# Patient Record
Sex: MALE | Race: WHITE | HISPANIC OR LATINO | Employment: FULL TIME | URBAN - METROPOLITAN AREA
[De-identification: names, ages, dates, MRNs, and addresses within clinical notes are randomized per-mention and may not be internally consistent; named-entity substitution may affect disease eponyms.]

---

## 2024-05-24 ENCOUNTER — OFFICE VISIT (OUTPATIENT)
Dept: URGENT CARE | Facility: PHYSICIAN GROUP | Age: 19
End: 2024-05-24
Payer: COMMERCIAL

## 2024-05-24 VITALS
WEIGHT: 131 LBS | HEART RATE: 98 BPM | HEIGHT: 67 IN | OXYGEN SATURATION: 98 % | SYSTOLIC BLOOD PRESSURE: 100 MMHG | TEMPERATURE: 99 F | DIASTOLIC BLOOD PRESSURE: 68 MMHG | BODY MASS INDEX: 20.56 KG/M2 | RESPIRATION RATE: 16 BRPM

## 2024-05-24 DIAGNOSIS — H69.92 DYSFUNCTION OF LEFT EUSTACHIAN TUBE: ICD-10-CM

## 2024-05-24 DIAGNOSIS — H61.23 BILATERAL IMPACTED CERUMEN: ICD-10-CM

## 2024-05-24 DIAGNOSIS — H91.93 DECREASED HEARING OF BOTH EARS: ICD-10-CM

## 2024-05-24 PROCEDURE — 3078F DIAST BP <80 MM HG: CPT | Performed by: NURSE PRACTITIONER

## 2024-05-24 PROCEDURE — 69210 REMOVE IMPACTED EAR WAX UNI: CPT | Mod: 50 | Performed by: NURSE PRACTITIONER

## 2024-05-24 PROCEDURE — 99203 OFFICE O/P NEW LOW 30 MIN: CPT | Mod: 25 | Performed by: NURSE PRACTITIONER

## 2024-05-24 PROCEDURE — 3074F SYST BP LT 130 MM HG: CPT | Performed by: NURSE PRACTITIONER

## 2024-05-24 NOTE — PROGRESS NOTES
"Phuc Galeana is a 18 y.o. male who presents for Cerumen Impaction (X2 days, left ear, slight pain, has a pulsing feeling)      HPI  This is a new problem. Phuc Galeana is a 18 y.o. patient who presents to urgent care with c/o: decreased hearing in both ears. Left ear is popping and a little painful . No recent illness. Denies rhinitis, st, ha, eye drainage. Tx tried: none. No other aggravating or alleviating factors.       ROS See HPI    Allergies:     No Known Allergies    PMSFS Hx:  History reviewed. No pertinent past medical history.  History reviewed. No pertinent surgical history.  History reviewed. No pertinent family history.  Social History     Tobacco Use    Smoking status: Not on file    Smokeless tobacco: Not on file   Substance Use Topics    Alcohol use: Not on file       Problems:   There is no problem list on file for this patient.      Medications:   No current outpatient medications on file prior to visit.     No current facility-administered medications on file prior to visit.        Objective:     /68 (BP Location: Right arm, Patient Position: Sitting, BP Cuff Size: Small adult)   Pulse 98   Temp 37.2 °C (99 °F)   Resp 16   Ht 1.702 m (5' 7\")   Wt 59.4 kg (131 lb)   SpO2 98%   BMI 20.52 kg/m²     Physical Exam  Vitals and nursing note reviewed.   HENT:      Right Ear: Hearing, tympanic membrane, ear canal and external ear normal. There is impacted cerumen.      Left Ear: Hearing, ear canal and external ear normal. A middle ear effusion (serous) is present. There is impacted cerumen.      Ears:      Comments:   Bilateral Cerumen Removal Procedure:  I have discussed the potential risks of this procedure including but not limited to mild discomfort with a sensation of ear fullness and wetness, dizziness, ear canal inflammation, ear canal abrasion with bleeding, and/or ear drum perforation.Patient is aware and consents to the procedure.  Cerumen removed easily with flushing, " romi.  Curettage was done by provider  Pt tolerated well. No adverse effects.          Mouth/Throat:      Mouth: Mucous membranes are moist.   Cardiovascular:      Rate and Rhythm: Normal rate and regular rhythm.      Pulses: Normal pulses.      Heart sounds: Normal heart sounds.   Pulmonary:      Effort: Pulmonary effort is normal.      Breath sounds: Normal breath sounds.   Lymphadenopathy:      Head:      Right side of head: No tonsillar adenopathy.      Left side of head: No tonsillar adenopathy.      Cervical: No cervical adenopathy.   Skin:     General: Skin is warm and dry.      Capillary Refill: Capillary refill takes less than 2 seconds.   Neurological:      Mental Status: He is alert and oriented to person, place, and time.   Psychiatric:         Mood and Affect: Mood normal.         Behavior: Behavior normal.         Assessment /Associated Orders:      1. Bilateral impacted cerumen        2. Decreased hearing of both ears        3. Dysfunction of left eustachian tube              Medical Decision Making:    Phuc is a very pleasant 18 y.o. male who is clinically stable at today's acute urgent care visit.  No acute distress noted.  VSS. Appropriate for outpatient care at this time.   Do not use cotton swabs in ears.   No evidence of AOM or other acute bacterial  or viral infection today.  Cerumen removed without difficulty. Pt reports improvement in hearing bilaterally.   OTC ear wax drops monthly as directed by  to prevent cerumen impactions.   OTC  analgesic of choice (acetaminophen or NSAID) prn pain/ discomfort. Follow manufactures dosing and safety precautions.   OTC antihistamine of choice. Follow manufactures dosing and safety guidelines.   Discussed Dx, management options (risks,benefits, and alternatives to planned treatment), natural progression and supportive care.  Expressed understanding and the treatment plan was agreed upon.   Questions were encouraged and answered   Return  to urgent care prn if new or worsening sx or if there is no improvement in condition prn.            Please note that this dictation was created using voice recognition software. I have worked with consultants from the vendor as well as technical experts from [facility] to optimize the interface. I have made every reasonable attempt to correct obvious errors, but I expect that there are errors of grammar and possibly content that I did not discover before finalizing the note.  This note was electronically signed by provider

## 2024-06-19 ENCOUNTER — OFFICE VISIT (OUTPATIENT)
Dept: URGENT CARE | Facility: PHYSICIAN GROUP | Age: 19
End: 2024-06-19
Payer: COMMERCIAL

## 2024-06-19 VITALS
HEART RATE: 109 BPM | OXYGEN SATURATION: 98 % | WEIGHT: 134.48 LBS | HEIGHT: 67 IN | BODY MASS INDEX: 21.11 KG/M2 | TEMPERATURE: 98.9 F | DIASTOLIC BLOOD PRESSURE: 56 MMHG | RESPIRATION RATE: 21 BRPM | SYSTOLIC BLOOD PRESSURE: 106 MMHG

## 2024-06-19 DIAGNOSIS — L50.9 HIVES: ICD-10-CM

## 2024-06-19 PROCEDURE — 3078F DIAST BP <80 MM HG: CPT

## 2024-06-19 PROCEDURE — 99213 OFFICE O/P EST LOW 20 MIN: CPT

## 2024-06-19 PROCEDURE — 3074F SYST BP LT 130 MM HG: CPT

## 2024-06-19 RX ORDER — PREDNISONE 10 MG/1
40 TABLET ORAL DAILY
Qty: 20 TABLET | Refills: 0 | Status: SHIPPED | OUTPATIENT
Start: 2024-06-19 | End: 2024-06-24

## 2024-06-19 NOTE — PROGRESS NOTES
"Chief Complaint   Patient presents with    Urticaria     X4 days that come and go, increase and decrease and just randomly appear. Ttook an Allegra pill and put hydrocortisone spray on the flareups, last taken last night ~12am.         Subjective:   HISTORY OF PRESENT ILLNESS: Phuc Galeana is a 19 y.o. male who presents for on and off hives x 4 days.  States that he just return from the [facility] and did get a few bug bites but other than that he cannot associate these hives to any new soaps, food or meds   Patient denies oral involvement, wheezing or SOB    Medications, Allergies, current problem list, Social and Family history reviewed today in Epic.     Objective:     /56 (BP Location: Right arm, Patient Position: Sitting, BP Cuff Size: Adult)   Pulse (!) 109   Temp 37.2 °C (98.9 °F) (Temporal)   Resp (!) 21   Ht 1.702 m (5' 7\")   Wt 61 kg (134 lb 7.7 oz)   SpO2 98%     Physical Exam  Vitals reviewed.   Constitutional:       Appearance: Normal appearance.   HENT:      Mouth/Throat:      Mouth: Mucous membranes are moist.   Cardiovascular:      Rate and Rhythm: Normal rate.   Pulmonary:      Effort: Pulmonary effort is normal.   Skin:     General: Skin is warm and dry.      Comments: No hives noted on exam.  Multiple bug bites to his legs without surrounding erythema   Neurological:      Mental Status: He is alert and oriented to person, place, and time.   Psychiatric:         Mood and Affect: Mood normal.          Assessment/Plan:     Diagnosis and associated orders    I personally reviewed prior external notes and test results pertinent to today's visit.     1. Hives  Referral back to PCP    predniSONE (DELTASONE) 10 MG Tab            IMPRESSION:  Pt has stable vital signs and no red flag symptoms or exam findings identified.   Informed pt that symptoms are consistent with an allergic reaction, unknown cause.  He is currently symptoms free at this time.  I did advised to continue " antihistamine x 10 days.  I will prescribe prednisone to use if his symptoms come back and are not relieved with JCARLOS.  He should establish with primary care if this persists..  Advised to present to ER for any fevers, increasing symptoms, SOB or oral involvement    Differential diagnosis discussed. Pt was Educated on red flag symptoms. Pt has been Instructed to return to Urgent Care or nearest Emergency Department if symptoms fail to improve, for any change in condition, further concerns, or new concerning symptoms. Patient states understanding of the plan of care and discharge instructions.  They are discharged in stable condition.         Please note that this dictation was created using voice recognition software. I have made a reasonable attempt to correct obvious errors, but I expect that there are errors of grammar and possibly content that I did not discover before finalizing the note.    This note was electronically signed by IFRAH Rowland

## 2024-06-25 ENCOUNTER — OFFICE VISIT (OUTPATIENT)
Dept: MEDICAL GROUP | Facility: PHYSICIAN GROUP | Age: 19
End: 2024-06-25
Payer: COMMERCIAL

## 2024-06-25 VITALS
HEIGHT: 67 IN | SYSTOLIC BLOOD PRESSURE: 98 MMHG | BODY MASS INDEX: 21.21 KG/M2 | OXYGEN SATURATION: 98 % | DIASTOLIC BLOOD PRESSURE: 56 MMHG | RESPIRATION RATE: 20 BRPM | WEIGHT: 135.13 LBS | HEART RATE: 88 BPM | TEMPERATURE: 99.1 F

## 2024-06-25 DIAGNOSIS — Z13.6 SCREENING FOR CARDIOVASCULAR CONDITION: ICD-10-CM

## 2024-06-25 DIAGNOSIS — Z13.228 SCREENING FOR ENDOCRINE, METABOLIC AND IMMUNITY DISORDER: ICD-10-CM

## 2024-06-25 DIAGNOSIS — Z11.3 SCREENING EXAMINATION FOR SEXUALLY TRANSMITTED DISEASE: ICD-10-CM

## 2024-06-25 DIAGNOSIS — E55.9 VITAMIN D DEFICIENCY: ICD-10-CM

## 2024-06-25 DIAGNOSIS — Z13.29 SCREENING FOR ENDOCRINE, METABOLIC AND IMMUNITY DISORDER: ICD-10-CM

## 2024-06-25 DIAGNOSIS — L50.9 URTICARIA: ICD-10-CM

## 2024-06-25 DIAGNOSIS — Z11.59 NEED FOR HEPATITIS C SCREENING TEST: ICD-10-CM

## 2024-06-25 DIAGNOSIS — Z13.0 SCREENING FOR ENDOCRINE, METABOLIC AND IMMUNITY DISORDER: ICD-10-CM

## 2024-06-25 DIAGNOSIS — J30.2 SEASONAL ALLERGIC RHINITIS, UNSPECIFIED TRIGGER: ICD-10-CM

## 2024-06-25 PROCEDURE — 3078F DIAST BP <80 MM HG: CPT

## 2024-06-25 PROCEDURE — 99214 OFFICE O/P EST MOD 30 MIN: CPT

## 2024-06-25 PROCEDURE — 3074F SYST BP LT 130 MM HG: CPT

## 2024-06-25 RX ORDER — FLUTICASONE PROPIONATE 50 MCG
1 SPRAY, SUSPENSION (ML) NASAL
COMMUNITY
End: 2024-06-25

## 2024-06-25 RX ORDER — LORATADINE 10 MG/1
TABLET ORAL
COMMUNITY
End: 2024-06-25

## 2024-06-25 ASSESSMENT — ENCOUNTER SYMPTOMS
BLOOD IN STOOL: 0
FEVER: 0
VOMITING: 0
WHEEZING: 0
TINGLING: 0
DIARRHEA: 0
SHORTNESS OF BREATH: 0
CONSTIPATION: 0
WEIGHT LOSS: 0
COUGH: 0
HEADACHES: 0
CHILLS: 0
DIZZINESS: 0
ABDOMINAL PAIN: 0
PALPITATIONS: 0
NAUSEA: 0

## 2024-06-25 ASSESSMENT — PATIENT HEALTH QUESTIONNAIRE - PHQ9: CLINICAL INTERPRETATION OF PHQ2 SCORE: 0

## 2024-06-25 NOTE — PROGRESS NOTES
"Subjective:     Chief Complaint   Patient presents with    Establish Care    Urticaria     On and off for a week sometimes on legs, or arms and sometimes stomach      Phuc Galeana is a 19 y.o. male, who is here today to establish care and discuss hives. His prior PCP was [facility].    Problem   Urticaria    Began experiencing urticaria to his right lower forearm [place] country.  Troubles with seasonal allergies and has taken antihistamines in the past.  Patient was seen by urgent care and started on Allegra as needed.  Patient symptoms are well-controlled in office, he is using it Allegra as needed.  Is interested in getting updated blood work.  Denies any shortness of breath and/or wheezing, chest discomfort or scratchy throat.     Seasonal Allergic Rhinitis    Patient does struggle with allergy symptoms.  Patient was previously using Flonase nasal spray and Claritin for antihistamine use.  Denies any symptoms in office today.      Allergies: Patient has no known allergies.    Current Outpatient Medications:     multivitamin Tab, Take 1 Tablet by mouth every day., Disp: , Rfl:     Fexofenadine HCl (ALLEGRA ALLERGY PO), Take  by mouth., Disp: , Rfl:      Review of Systems   Constitutional:  Negative for chills, fever and weight loss.   Respiratory:  Negative for cough, shortness of breath and wheezing.    Cardiovascular:  Negative for chest pain, palpitations and leg swelling.   Gastrointestinal:  Negative for abdominal pain, blood in stool, constipation, diarrhea, nausea and vomiting.   Genitourinary:  Negative for hematuria.   Skin:  Negative for itching and rash.   Neurological:  Negative for dizziness, tingling and headaches.     Health Maintenance: Completed    Objective:     BP 98/56 (BP Location: Left arm, Patient Position: Sitting, BP Cuff Size: Adult)   Pulse 88   Temp 37.3 °C (99.1 °F) (Temporal)   Resp 20   Ht 1.702 m (5' 7\")   Wt 61.3 kg (135 lb 2 oz)   " SpO2 98%  Body mass index is 21.16 kg/m².    Physical Exam  Vitals reviewed.   Constitutional:       General: He is not in acute distress.     Appearance: Normal appearance. He is not ill-appearing.   Cardiovascular:      Rate and Rhythm: Normal rate and regular rhythm.      Heart sounds: Normal heart sounds.   Pulmonary:      Effort: Pulmonary effort is normal.      Breath sounds: Normal breath sounds.   Abdominal:      General: Abdomen is flat.      Palpations: Abdomen is soft.   Musculoskeletal:         General: Normal range of motion.      Cervical back: Normal range of motion.   Skin:     General: Skin is warm and dry.   Neurological:      General: No focal deficit present.      Mental Status: He is alert.   Psychiatric:         Mood and Affect: Mood normal.         Behavior: Behavior normal.         Thought Content: Thought content normal.         Judgment: Judgment normal.        Assessment & Plan:     The following plan was discussed through shared decision making with the patient:    1. Urticaria  Acute, uncomplicated.  Patient does have history of seasonal allergies.  Will get a allergen panel to further rule out possible cause of this urticaria/hives.  Will follow-up in [facility] with lab results.  - ALLERGEN, FOOD INCLUSIVE PANEL; Future    2. Seasonal allergic rhinitis, unspecified trigger  Chronic, Controlled.  Discussed that not one medication is likely to provide complete relief. Discussed allergen avoidance and environment modification when possible, showering and shampooing before bed, taking shoes off indoors so not tracking pollen in home.  Discussed that medications are more effective with daily use, especially two weeks in duration.  Advised Zyrtec or Allegra OTC, Nasal steroid and/or antihistamine, OTC allergy eye drops prn. If not effective, may consider referral to allergy.    3. Screening for endocrine, metabolic and immunity disorder  Patient establishing care today in office; due for  updated lab work/screenings.  Will follow-up in [facility] with lab results.  Discussed with patient that if there is a significant amount of lab abnormalities we will have a follow-up appointment and I will indicate this is in my follow-up message with lab results.  Patient stated verbal understanding.  - CBC WITH DIFFERENTIAL; Future  - Comp Metabolic Panel; Future  - HEMOGLOBIN A1C; Future  - TSH WITH REFLEX TO FT4; Future    4. Screening for cardiovascular condition  See #3  - Lipid Profile; Future    5. Vitamin D deficiency  See #3  - VITAMIN D,25 HYDROXY (DEFICIENCY); Future    6. Screening examination for sexually transmitted disease  See #3; additionally provided standing orders for STI screening per patient's request.  - HIV AG/AB COMBO ASSAY SCREENING; Future  - CHLAMYDIA/GC AMP URINE OR SWAB; Future  - T.PALLIDUM AB KACIE (SCREENING); Future  - HSV-1 AND HSV-2 SUBTYPE, PCR; Future  - HEP C VIRUS ANTIBODY; Standing  - T.PALLIDUM AB KACIE (SCREENING); Standing  - HSV-1 AND HSV-2 SUBTYPE, PCR; Standing  - CHLAMYDIA/GC AMP URINE OR SWAB; Standing  - HIV AG/AB COMBO ASSAY SCREENING; Standing    7. Need for hepatitis C screening test  See #3  - HEP C VIRUS ANTIBODY; Future    Return in about 6 months (around 12/25/2024) for Annual, Labs.         Please note that this dictation was created using voice recognition software. I have made every reasonable attempt to correct obvious errors, but I expect that there are errors of grammar and possibly content that I did not discover before finalizing the note.    LANE Simmons  [facility]  [facility]